# Patient Record
Sex: FEMALE | Race: WHITE
[De-identification: names, ages, dates, MRNs, and addresses within clinical notes are randomized per-mention and may not be internally consistent; named-entity substitution may affect disease eponyms.]

---

## 2019-11-11 ENCOUNTER — HOSPITAL ENCOUNTER (EMERGENCY)
Dept: HOSPITAL 26 - MED | Age: 24
Discharge: HOME | End: 2019-11-11
Payer: COMMERCIAL

## 2019-11-11 VITALS — BODY MASS INDEX: 34.78 KG/M2 | HEIGHT: 62 IN | WEIGHT: 189 LBS

## 2019-11-11 VITALS — DIASTOLIC BLOOD PRESSURE: 74 MMHG | SYSTOLIC BLOOD PRESSURE: 120 MMHG

## 2019-11-11 VITALS — SYSTOLIC BLOOD PRESSURE: 120 MMHG | DIASTOLIC BLOOD PRESSURE: 74 MMHG

## 2019-11-11 DIAGNOSIS — G89.29: ICD-10-CM
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
## 2019-11-11 DIAGNOSIS — M54.5: ICD-10-CM

## 2019-11-11 DIAGNOSIS — S80.01XA: Primary | ICD-10-CM
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
## 2019-11-11 NOTE — NUR
24 YEAR OLD PATIENT COMPLAINS OF RIGHT KNEE PAIN AFTER FALL. SWELLING NOTED ON 
RIGHT KNEE. PATIENT STATES SHE HAS 9/10 PAIN ON RIGHT LEG. +ROM. CMS INTACT. 
PATIENT ALERT AND ORIENTED, BED IN LOWEST POSITION, LOCKED, BED RAIL UPX1

## 2019-11-11 NOTE — NUR
Patient discharged with v/s stable. Written and verbal after care instructions 
given and explained ABOUT KNEE PAIN. 

Patient alert, oriented and verbalized understanding of instructions. Wheel 
Chair Assisted with to car. All questions addressed prior to discharge. ID band 
removed. Patient advised to follow up with PMD. Rx of  given OF NORCO, ZOFRAN, 
AND MOTRIN. Patient educated on indication of medication including possible 
reaction and side effects. Opportunity to ask questions provided and answered. 
EXCUSE GIVEN FOR WORK.